# Patient Record
Sex: FEMALE | Race: WHITE | NOT HISPANIC OR LATINO | URBAN - METROPOLITAN AREA
[De-identification: names, ages, dates, MRNs, and addresses within clinical notes are randomized per-mention and may not be internally consistent; named-entity substitution may affect disease eponyms.]

---

## 2023-10-18 ENCOUNTER — EMERGENCY (EMERGENCY)
Facility: HOSPITAL | Age: 43
LOS: 1 days | Discharge: HOME | End: 2023-10-18
Attending: STUDENT IN AN ORGANIZED HEALTH CARE EDUCATION/TRAINING PROGRAM
Payer: COMMERCIAL

## 2023-10-18 VITALS
TEMPERATURE: 98 F | HEIGHT: 63 IN | RESPIRATION RATE: 20 BRPM | SYSTOLIC BLOOD PRESSURE: 114 MMHG | OXYGEN SATURATION: 100 % | DIASTOLIC BLOOD PRESSURE: 77 MMHG | HEART RATE: 88 BPM | WEIGHT: 149.91 LBS

## 2023-10-18 PROCEDURE — 73030 X-RAY EXAM OF SHOULDER: CPT

## 2023-10-18 PROCEDURE — 96372 THER/PROPH/DIAG INJ SC/IM: CPT

## 2023-10-18 PROCEDURE — 70450 CT HEAD/BRAIN W/O DYE: CPT | Mod: MA

## 2023-10-18 PROCEDURE — 73030 X-RAY EXAM OF SHOULDER: CPT | Mod: 26,LT

## 2023-10-18 PROCEDURE — 72125 CT NECK SPINE W/O DYE: CPT | Mod: MA

## 2023-10-18 PROCEDURE — 70450 CT HEAD/BRAIN W/O DYE: CPT | Mod: 26,MA

## 2023-10-18 PROCEDURE — 72125 CT NECK SPINE W/O DYE: CPT | Mod: 26,MA

## 2023-10-18 PROCEDURE — 71046 X-RAY EXAM CHEST 2 VIEWS: CPT

## 2023-10-18 PROCEDURE — 71046 X-RAY EXAM CHEST 2 VIEWS: CPT | Mod: 26

## 2023-10-18 PROCEDURE — 73020 X-RAY EXAM OF SHOULDER: CPT | Mod: 26,59,LT

## 2023-10-18 PROCEDURE — 99284 EMERGENCY DEPT VISIT MOD MDM: CPT

## 2023-10-18 PROCEDURE — 73020 X-RAY EXAM OF SHOULDER: CPT

## 2023-10-18 PROCEDURE — 99284 EMERGENCY DEPT VISIT MOD MDM: CPT | Mod: 25

## 2023-10-18 RX ORDER — DIAZEPAM 5 MG
2 TABLET ORAL ONCE
Refills: 0 | Status: DISCONTINUED | OUTPATIENT
Start: 2023-10-18 | End: 2023-10-18

## 2023-10-18 RX ORDER — METHOCARBAMOL 500 MG/1
750 TABLET, FILM COATED ORAL ONCE
Refills: 0 | Status: COMPLETED | OUTPATIENT
Start: 2023-10-18 | End: 2023-10-18

## 2023-10-18 RX ORDER — KETOROLAC TROMETHAMINE 30 MG/ML
15 SYRINGE (ML) INJECTION ONCE
Refills: 0 | Status: DISCONTINUED | OUTPATIENT
Start: 2023-10-18 | End: 2023-10-18

## 2023-10-18 RX ORDER — LIDOCAINE 4 G/100G
1 CREAM TOPICAL ONCE
Refills: 0 | Status: COMPLETED | OUTPATIENT
Start: 2023-10-18 | End: 2023-10-18

## 2023-10-18 RX ADMIN — LIDOCAINE 1 PATCH: 4 CREAM TOPICAL at 22:28

## 2023-10-18 RX ADMIN — Medication 15 MILLIGRAM(S): at 22:03

## 2023-10-18 RX ADMIN — METHOCARBAMOL 750 MILLIGRAM(S): 500 TABLET, FILM COATED ORAL at 22:28

## 2023-10-18 RX ADMIN — Medication 2 MILLIGRAM(S): at 22:27

## 2023-10-18 NOTE — ED PROVIDER NOTE - PATIENT PORTAL LINK FT
You can access the FollowMyHealth Patient Portal offered by [facility] by registering at the following website: [email or web address]. By joining [employer]’s FollowMyHealth portal, you will also be able to view your health information using other applications (apps) compatible with our system.
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
## 2023-10-18 NOTE — ED PROVIDER NOTE - PHYSICAL EXAMINATION
PRIMARY SURVEY:  AIRWAY: Upper Airway intact.  Trachea midline.  Patient phonating well.  BREATHING:  Breath sounds present bilaterally. (-) crepitation.   CIRCULATION:  Good pulses bilaterally.  capillary refill < 2sec.  DISABILITY:  Patient is awake and alert.  Initial GCS = 15.    SECONDARY SURVEY:  SKIN:  Warm, dry; (-) cyanosis.  HEAD:  (-) scalp swelling, (-) scalp tenderness  EYES:  PERRL, EOMI.  FACE:   (-) deformity, (-) crepitance.  (-) wounds.  EARS: (-) hemotympanum.  MOUTH: Teeth occlude normally. (-) signs of trauma   NECK:  (-) paravertebral tenderness, (-) midline tenderness; (-) crepitus, (-) "step-off".  CHEST:  (-) tenderness; (-) crepitus.   LUNGS:  (+) breath sounds equal bilaterally.  (-) wheezes, (-) rhonchi, (-) rales.  HEART AND CARDIOVASCULAR:  (-) irregularity; (-) murmur, (-) gallop.  ABDOMEN AND GI:  (-) ecchymosis, (-) abrasion, (-) distention, (-) tenderness, (-) guarding, (-) rebound, (-) rigidity.  GENITOURINARY: Normal external genitalia. (-) blood at urethral meatus.  RECTAL:  tone normal; (-) gross blood.  SPINE: (+) left paraspinal tenderness to palpation. (+) C spine midline tenderness, no deformity, step-off, or para vertebral tenderness.  PELVIS:  (-) pelvis tenderness  EXTREMITY:  (-) extremity tenderness or limitation of motion. (-) wounds.  NEURO AND PSYCH:  GCS 15.  Strength, sensation WNL.  (-) focal neurologic deficits.
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
## 2023-10-18 NOTE — ED ADULT NURSE NOTE - SUICIDE SCREENING QUESTION 3
PA denied for Accu-chek Ruby , insurance will approve Freestyle or one-touch. Will send over new prescription.   No
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
## 2023-10-19 VITALS
SYSTOLIC BLOOD PRESSURE: 93 MMHG | HEART RATE: 79 BPM | DIASTOLIC BLOOD PRESSURE: 64 MMHG | OXYGEN SATURATION: 99 % | RESPIRATION RATE: 18 BRPM | TEMPERATURE: 98 F